# Patient Record
Sex: MALE | Race: WHITE
[De-identification: names, ages, dates, MRNs, and addresses within clinical notes are randomized per-mention and may not be internally consistent; named-entity substitution may affect disease eponyms.]

---

## 2021-07-06 ENCOUNTER — HOSPITAL ENCOUNTER (OUTPATIENT)
Dept: HOSPITAL 53 - M PLAIMG | Age: 21
End: 2021-07-06
Attending: INTERNAL MEDICINE

## 2021-07-06 DIAGNOSIS — M54.5: Primary | ICD-10-CM

## 2021-07-06 NOTE — REP
INDICATION:

BACK/CP



COMPARISON:

None.



TECHNIQUE:

AP, lateral, coned-down views of the lumbar spine.



FINDINGS:

Three views of the lumbosacral spine demonstrate satisfactory alignment and lordosis

without acute fracture / compression injury or subluxation.  No significant congenital

or degenerative changes are appreciated.



IMPRESSION:

1. No acute fracture / compression injury or subluxation.

2. No significant degenerative changes appreciated





<Electronically signed by Gabino Puga > 07/06/21 2020